# Patient Record
(demographics unavailable — no encounter records)

---

## 2025-03-13 NOTE — HISTORY OF PRESENT ILLNESS
[FreeTextEntry1] : We had the pleasure of seeing RIC TATE today for follow up in [facility]. As you are aware, RIC is a 19-year-old girl who was referred for cardiac evaluation in the setting of a cardiac arrest in October of 2022. She experienced a cardiac arrest shortly after gym class when she went to get water. She was resuscitated by her school nurse and received an appropriate shock. In the hospital she had recurrent VT and was switched from Amiodarone to Lidocaine. Her echo showed biventricular dysfunction, and her inflammatory markers were consistent with myocarditis in the setting of being (+) for rhinoentero virus. She remained neurologically intact and recovered in the PICU. She was eventually weaned off inotropes and placed on Enalapril, Carvedilol, Lasix, Aldactone, Calcium supplementation and oral Amiodarone. 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 She has an ICD and is on goal directed therapy with moderate dysfunction. We had previously tried to wean off her Lasix, but she felt that she was getting some leg swelling so it was resumed with reported improvement. She was started on Entresto and is now on her therapeutic dosage.   [employer] issued an updated genetics report in January, 2025. 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 This variant was recently downgraded to LIKELY BENIGN - no longer relevant. The remainder of the report has not changed.    She continues to report vague intermittent chest pain over and around the site of her ICD as well as more broadly across her chest. This is worse with chest wall motion. She was evaluated in the ER in February and discharged following a normal work up. She was then seen here and underwent a stress test which revealed a blunted heart rate response, on Sotalol. There were no arrhythmias.  She exercised for 12 minutes before the test was stopped for fatigue. Her max VO2 was 61% of predicted. There was no desaturation with exercise.   She has had no reported shocks from her ICD. . There are no reports of palpitations, chest discomfort, peripheral edema, syncope, near syncope.  She does not participate in any structured exercise regimen. She is not currently under the care of a therapist following her traumatic experience.  lower

## 2025-03-13 NOTE — DISCUSSION/SUMMARY
[FreeTextEntry1] : Spenser is a 19year old female with ARVC, s/p cardiac arrest and resusitation in 2022. She has a typical pathogenic DSP mutation as well as ventricular arrhythmia, repolarization abnormalities and, although not classic, suggestive findings on her imaging.   Today her echocardiogram is unchanged with an EF 43%  She works late hours at [employer] which could account for some of her fatigue.  Her recent complaints do not sound cardiac in nature and are more likely musculoskeletal in nature, especially with a negative work up in February.  Her physical exam was notable for quite reproducible pain to palpation of the chest wall on both sides (not over the PM specifically). She had no swelling, hepatomegaly and no JVD and can lay flat without symptoms. She has a follow up with Dr. Jones at the end of the month and if cleared, she is looking froward to getting her driving permit. We have recommended Spenser reach out to her mental health team and to start seeing them again to assess her for treatment for her anxiety.   From a cardiac standpoint there are should anesthesia or surgical procedures be needed cardiac anesthesia should be consulted. There is no requirement for SBE prophylaxis prior to procedures.   From a CV perspective all routine vaccinations including flu vaccination are recommended  Follow-up is recommended in 9 month including an ECG and Echocardiogram. We will then try to resume a schedule where we alternate with EP every 3 months.  [Needs SBE Prophylaxis] : [unfilled] does not need bacterial endocarditis prophylaxis [Participate only in Mild PE activities] : [unfilled] may participate ONLY IN MILD physical education activities such as [place] games, golf, and badminton. [Influenza vaccine is recommended] : Influenza vaccine is recommended

## 2025-03-13 NOTE — CONSULT LETTER
[Today's Date] : [unfilled] [Dear  ___:] : Dear Dr. [unfilled]: [Sincerely,] : Sincerely, [Consult - Multiple Provider] : Thank you very much for allowing us to participate in the care of this patient. If you have any questions, please do not hesitate to contact us. [FreeTextEntry4] : Dr Max [FreeTextEntry5] : [address] [FreeTextEntry6] : [place] [id number] [de-identified] : Bere Doshi, MSN, CPNP AC, PC Pediatric Cardiology Pediatric Heart Transplant and Heart Failure [facility] [place] [facility]   David Ott MD, FAAP  and Systems Chief, Pediatric Cardiology [facility] [address], [address] [place] [address] Office: [phone] Fax: [phone]

## 2025-03-13 NOTE — CARDIOLOGY SUMMARY
[Today's Date] : [unfilled] [FreeTextEntry1] : sinus bradycardia, ventricular rate 57bpm, non specific t wave abnormality [FreeTextEntry2] : Personal preliminary review of the echocardiogram revealed normal cardiac architecture, and normal cardiac anatomy. Moderate, stable LV dysfunction. There was no pericardial effusion. Final report pending.

## 2025-03-13 NOTE — PHYSICAL EXAM
[General Appearance - Alert] : alert [General Appearance - In No Acute Distress] : in no acute distress [General Appearance - Well Nourished] : well nourished [General Appearance - Well Developed] : well developed [General Appearance - Well-Appearing] : well appearing [Appearance Of Head] : the head was normocephalic [Facies] : there were no dysmorphic facial features [Sclera] : the conjunctiva were normal [Outer Ear] : the ears and nose were normal in appearance [Examination Of The Oral Cavity] : mucous membranes were moist and pink [Auscultation Breath Sounds / Voice Sounds] : breath sounds clear to auscultation bilaterally [Normal Chest Appearance] : the chest was normal in appearance [Apical Impulse] : quiet precordium with normal apical impulse [Heart Rate And Rhythm] : normal heart rate and rhythm [Heart Sounds] : normal S1 and S2 [No Murmur] : no murmurs  [Heart Sounds Gallop] : no gallops [Heart Sounds Pericardial Friction Rub] : no pericardial rub [Edema] : no edema [Arterial Pulses] : normal upper and lower extremity pulses with no pulse delay [Heart Sounds Click] : no clicks [Capillary Refill Test] : normal capillary refill [Bowel Sounds] : normal bowel sounds [Abdomen Soft] : soft [Nondistended] : nondistended [Abdomen Tenderness] : non-tender [Nail Clubbing] : no clubbing  or cyanosis of the fingers [Motor Tone] : normal muscle strength and tone [Cervical Lymph Nodes Enlarged Anterior] : The anterior cervical nodes were normal [Cervical Lymph Nodes Enlarged Posterior] : The posterior cervical nodes were normal [] : no rash [Skin Lesions] : no lesions [Skin Turgor] : normal turgor [Demonstrated Behavior - Infant Nonreactive To Parents] : interactive [Mood] : mood and affect were appropriate for age [Demonstrated Behavior] : normal behavior [FreeTextEntry7] : Defib site C/D/I

## 2025-03-13 NOTE — REASON FOR VISIT
[Follow-Up] : a follow-up visit for [Mother] : mother [ARVC] : arrhythmogenic right ventricular cardiomyopathy [FreeTextEntry3] : s/p ICD,

## 2025-03-31 NOTE — HISTORY OF PRESENT ILLNESS
[FreeTextEntry1] : We had the pleasure of evaluating Spenser in our pediatric cardiology office at [facility]. As you well know, she is an 19-year-old female with a significant history of arrhythmogenic right ventricular cardiomyopathy (ARVC), variant DSP gene, myocarditis with cardiac arrest in 10/2022, now s/p ICD placement in 12/2022.  Past medical history: She was transferred from an outside hospital ER to [facility] after experiencing a cardiac arrest shortly after gym class when she went to get water. She was resuscitated by her school nurse and received an appropriate shock. In the hospital she had recurrent VT and was switched from Amiodarone to Lidocaine. Her echo showed biventricular dysfunction and her inflammatory markers were consistent with myocarditis in the setting of being (+) for Rhinoentero Virus. She remained neurologically intact and recovered in the PICU. She was eventually weaned off inotropes and placed on chronic heart failure meds and oral Amiodarone. Her MRI showed signs of inflammation and biventricular enlargement and dysfunction. Of note her genetic testing came back positive for a pathogenic mutation DSP which is associated with ARVC and Cardiomyopathy. At her outpatient visit in Dec 15th, 2023, we reviewed a transmission that was sent in by Spenser demonstrating that she had a run of VT that was treated with ATP no shock was required. The episode lasted 11 seconds with a V rate 250 bpm. Since that visit, Spenser reports episodes of palpitations, sometimes up to 5 episodes a day, and she often feels her device pacing to counteract the palpitations. These episodes would usually be followed by an achy chest pain that resolves spontaneously. Occasionally, episodes would be accompanied by light-headedness and blurry vision, which quickly resolve once the episode has ended. She however denies syncope.  She presented at the [facility] ER on March 6th 2024 with complaints of chills, left sided chest pain and dizziness for 1 day. She also complained of her ICD battery pushing out against her chest causing her pain. Prior interrogation of her ICD noted that she continued to have frequent breakthrough episodes of VT despite optimization of her metoprolol dose. She was admitted to the pediatric ICU for optimization of her anti-arrhythmic therapy, Metoprolol was discontinued, and she was started on Sotalol 80mg BiD.  Later increased to 120 mg for break through VT. She remains on her other home meds: Aspirin, Entresto, Aldactone, Lasix.  Spenser had her ICD pocket revised by Dr Lane on 4/3/2024 and had Medtronic [place] ICD placed.  She has been healing well without any signs of infection or device malfunction.  She has had 1 episode of VT that was treated with ATP since her device placement seen on a transmission.     Between her visits on 4/30/2024 and 7/8/2024, she had been tolerating her Sotalol and has not received any shocks from her device.  She had an episode of chest pain (precordial, moderate) and bradycardia to the 40s on June 21, 2024 and was evaluated in the [facility] ER with normal findings including a normal Xray.  She was felt to be dehydrated, given IV fluids and felt better with that.  The chest pain has since subsided.    Since her last visit on 7/8/2024, she had some more chest pain involving her entire left chest and she was evaluated in the ER as having musculoskeletal pain.  She is otherwise well without therapies from her device and without palpitations or syncope.

## 2025-03-31 NOTE — CONSULT LETTER
[Today's Date] : [unfilled] [Dear  ___:] : Dear Dr. [unfilled]: [Consult - Multiple Provider] : Thank you very much for allowing us to participate in the care of this patient. If you have any questions, please do not hesitate to contact us. [Sincerely,] : Sincerely, [Name] : Name: [unfilled] [] : : ~~ [Today's Date:] : [unfilled] [FreeTextEntry4] : Dr. Desiree Max [FreeTextEntry5] : [address] [FreeTextEntry6] : [place] [id number] [de-identified] :    Nile Jones MD, FHRS Associate Chief, Pediatric Cardiology , Pediatric Cardiac Electrophysiology [facility] Professor of Pediatrics [facility]

## 2025-03-31 NOTE — END OF VISIT
[FreeTextEntry3] : I, Dr. Jones, personally performed the evaluation and management (E/M) services for this established patient who presents today. That E/M includes conducting the examination, assessing all new/exacerbated conditions. reassessing pre-existing conditions, and establishing a new or updated plan of care. Today, the RN documented the Chief Complaint, source of information and performed med and allergy reconciliation with or without education.  The timing listed under billing is the time I personally spent reviewing material, evaluating the patient, discussing management issues, coordinating services, and making documentation.  [Time Spent: ___ minutes] : I have spent [unfilled] minutes of time on the encounter which excludes teaching and separately reported services.

## 2025-03-31 NOTE — PHYSICAL EXAM
[General Appearance - Alert] : alert [General Appearance - In No Acute Distress] : in no acute distress [General Appearance - Well Nourished] : well nourished [General Appearance - Well Developed] : well developed [General Appearance - Well-Appearing] : well appearing [Appearance Of Head] : the head was normocephalic [Facies] : there were no dysmorphic facial features [Sclera] : the conjunctiva were normal [Outer Ear] : the ears and nose were normal in appearance [Examination Of The Oral Cavity] : mucous membranes were moist and pink [Auscultation Breath Sounds / Voice Sounds] : breath sounds clear to auscultation bilaterally [Normal Chest Appearance] : the chest was normal in appearance [Chest Surgical / Traumatic Scar] : chest incision well healed [Tenderness Costochondral Junction Left] : tenderness [Apical Impulse] : quiet precordium with normal apical impulse [Heart Rate And Rhythm] : normal heart rate and rhythm [Heart Sounds] : normal S1 and S2 [No Murmur] : no murmurs  [Heart Sounds Gallop] : no gallops [Heart Sounds Pericardial Friction Rub] : no pericardial rub [Edema] : no edema [Arterial Pulses] : normal upper and lower extremity pulses with no pulse delay [Heart Sounds Click] : no clicks [Capillary Refill Test] : normal capillary refill [Bowel Sounds] : normal bowel sounds [Abdomen Soft] : soft [Nondistended] : nondistended [Abdomen Tenderness] : non-tender [Nail Clubbing] : no clubbing  or cyanosis of the fingers [Motor Tone] : normal muscle strength and tone [Cervical Lymph Nodes Enlarged Anterior] : The anterior cervical nodes were normal [Cervical Lymph Nodes Enlarged Posterior] : The posterior cervical nodes were normal [] : no rash [Skin Lesions] : no lesions [Skin Turgor] : normal turgor [Demonstrated Behavior - Infant Nonreactive To Parents] : interactive [Mood] : mood and affect were appropriate for age [Demonstrated Behavior] : normal behavior

## 2025-03-31 NOTE — DISCUSSION/SUMMARY
[FreeTextEntry1] : It was my pleasure to see Spenser for follow up of her ARVC.  Her device check is as documented above.  We are pleased to see that the increase of her Sotalol is working well.  Her chest pain seems to be musculoskeletal and I suggested Motrin.  She will send us a transmission in 3 months and we will see her again in 6 months.   She will follow up with Dr Ott in November.  She will be cleared to get her 's license from an ep standpoint as she has not had any episodes in over 6 months and is doing very well on the Sotalol. [Needs SBE Prophylaxis] : [unfilled] does not need bacterial endocarditis prophylaxis

## 2025-03-31 NOTE — CARDIOLOGY SUMMARY
[Today's Date] : [unfilled] [de-identified] : ICD EVALUATION (See separate report for full details): Spenser presented in NSR @ 73 bpm while her Medtronic [place] ICD was programmed in the DDD mode with a lower rate of 40 bpm. The battery longevity was estimated at 11.3 years. There were no episodes.   Her lead parameters are within normal limits. No Changes made.